# Patient Record
Sex: FEMALE | HISPANIC OR LATINO | Employment: UNEMPLOYED | URBAN - METROPOLITAN AREA
[De-identification: names, ages, dates, MRNs, and addresses within clinical notes are randomized per-mention and may not be internally consistent; named-entity substitution may affect disease eponyms.]

---
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
## 2025-03-31 ENCOUNTER — HOSPITAL ENCOUNTER (OUTPATIENT)
Dept: RADIOLOGY | Facility: MEDICAL CENTER | Age: 25
End: 2025-03-31
Attending: FAMILY MEDICINE
Payer: COMMERCIAL

## 2025-03-31 DIAGNOSIS — M41.9 KYPHOSCOLIOSIS: ICD-10-CM

## 2025-03-31 DIAGNOSIS — M54.50 LOIN PAIN-HEMATURIA SYNDROME: ICD-10-CM

## 2025-03-31 DIAGNOSIS — R31.9 LOIN PAIN-HEMATURIA SYNDROME: ICD-10-CM

## 2025-03-31 PROCEDURE — 72081 X-RAY EXAM ENTIRE SPI 1 VW: CPT

## 2025-03-31 PROCEDURE — 72110 X-RAY EXAM L-2 SPINE 4/>VWS: CPT
